# Patient Record
Sex: MALE | Race: WHITE | NOT HISPANIC OR LATINO
[De-identification: names, ages, dates, MRNs, and addresses within clinical notes are randomized per-mention and may not be internally consistent; named-entity substitution may affect disease eponyms.]

---

## 2020-04-03 ENCOUNTER — APPOINTMENT (OUTPATIENT)
Dept: PULMONOLOGY | Facility: CLINIC | Age: 40
End: 2020-04-03
Payer: MEDICAID

## 2020-04-03 VITALS
SYSTOLIC BLOOD PRESSURE: 140 MMHG | TEMPERATURE: 98.6 F | HEART RATE: 114 BPM | OXYGEN SATURATION: 98 % | DIASTOLIC BLOOD PRESSURE: 80 MMHG

## 2020-04-03 DIAGNOSIS — Z78.9 OTHER SPECIFIED HEALTH STATUS: ICD-10-CM

## 2020-04-03 DIAGNOSIS — Z87.891 PERSONAL HISTORY OF NICOTINE DEPENDENCE: ICD-10-CM

## 2020-04-03 DIAGNOSIS — Z86.59 PERSONAL HISTORY OF OTHER MENTAL AND BEHAVIORAL DISORDERS: ICD-10-CM

## 2020-04-03 DIAGNOSIS — R06.02 SHORTNESS OF BREATH: ICD-10-CM

## 2020-04-03 DIAGNOSIS — Z82.49 FAMILY HISTORY OF ISCHEMIC HEART DISEASE AND OTHER DISEASES OF THE CIRCULATORY SYSTEM: ICD-10-CM

## 2020-04-03 PROBLEM — Z00.00 ENCOUNTER FOR PREVENTIVE HEALTH EXAMINATION: Status: ACTIVE | Noted: 2020-04-03

## 2020-04-03 PROCEDURE — 99214 OFFICE O/P EST MOD 30 MIN: CPT

## 2020-04-03 RX ORDER — ALPRAZOLAM 0.25 MG/1
0.25 TABLET ORAL TWICE DAILY
Qty: 60 | Refills: 0 | Status: ACTIVE | COMMUNITY
Start: 2020-04-03 | End: 1900-01-01

## 2020-04-03 NOTE — ASSESSMENT
[FreeTextEntry1] : Pt with complaints of SOB and anxiety.  He has used albuterol without relief of SOB.  He has used Valium which helps to his anxiety.  Lung exam unremarkable and oxygen saturation 98%.  Discussed I do not believe this SOB is due to asthma most likely related to anxiety.  He was prescribed Xanax to use as needed for anxiety at this time, discussed side effects of medication.  Pt will need to follow up post COVID for PFT to r/o asthma, no need for inhalers at this time.  Discussed mediation and therapy in addition to Xanax as needed.  He is not to drive and drink ETOH with Xanax.

## 2020-04-03 NOTE — HISTORY OF PRESENT ILLNESS
[Former] : former [Never] : never [TextBox_4] : 39 yr old male with anxiety.  He was deep cleaning the house with Clorox.  During that evening his breathing was  a little different.  He has been home since October due to shoulder surgery and feels anxious.  He felt he could not take a deep breathing in and needed to deep breath to catch his breath.  \par \par He used OSCAR for 2 days but did not feel much different.  He is having anxiety due to COVID and worried about family getting.  He feels better when walking outside.  He has not been eating as much as he used too.  He is using Valium from his mother for his anxiety 2 times a day.  He cut smoking since Saturday prior was smoking 1 pack every 2 days since teenager.  He is sleeping well.\par \par Denies coughing, fever, runny nose, chills. Denies chest pain, palpitations, diarrhea, abdominal pain, N&V.   [TextBox_15] : 2020

## 2020-11-16 ENCOUNTER — LABORATORY RESULT (OUTPATIENT)
Age: 40
End: 2020-11-16

## 2020-11-16 ENCOUNTER — APPOINTMENT (OUTPATIENT)
Dept: DISASTER EMERGENCY | Facility: CLINIC | Age: 40
End: 2020-11-16

## 2021-04-19 ENCOUNTER — APPOINTMENT (OUTPATIENT)
Dept: DISASTER EMERGENCY | Facility: OTHER | Age: 41
End: 2021-04-19

## 2025-08-18 ENCOUNTER — APPOINTMENT (OUTPATIENT)
Dept: PODIATRY | Facility: CLINIC | Age: 45
End: 2025-08-18